# Patient Record
Sex: MALE | Race: WHITE | NOT HISPANIC OR LATINO | URBAN - METROPOLITAN AREA
[De-identification: names, ages, dates, MRNs, and addresses within clinical notes are randomized per-mention and may not be internally consistent; named-entity substitution may affect disease eponyms.]

---

## 2017-07-21 ENCOUNTER — OUTPATIENT (OUTPATIENT)
Dept: OUTPATIENT SERVICES | Facility: HOSPITAL | Age: 67
LOS: 1 days | End: 2017-07-21
Payer: COMMERCIAL

## 2017-07-21 ENCOUNTER — APPOINTMENT (OUTPATIENT)
Dept: MRI IMAGING | Facility: CLINIC | Age: 67
End: 2017-07-21

## 2017-07-21 DIAGNOSIS — Z00.8 ENCOUNTER FOR OTHER GENERAL EXAMINATION: ICD-10-CM

## 2017-07-21 PROCEDURE — 72148 MRI LUMBAR SPINE W/O DYE: CPT

## 2017-08-04 ENCOUNTER — OUTPATIENT (OUTPATIENT)
Dept: OUTPATIENT SERVICES | Facility: HOSPITAL | Age: 67
LOS: 1 days | End: 2017-08-04
Payer: COMMERCIAL

## 2017-08-04 ENCOUNTER — APPOINTMENT (OUTPATIENT)
Dept: CT IMAGING | Facility: CLINIC | Age: 67
End: 2017-08-04
Payer: COMMERCIAL

## 2017-08-04 DIAGNOSIS — Z00.8 ENCOUNTER FOR OTHER GENERAL EXAMINATION: ICD-10-CM

## 2017-08-04 PROCEDURE — 74177 CT ABD & PELVIS W/CONTRAST: CPT

## 2017-08-04 PROCEDURE — 74177 CT ABD & PELVIS W/CONTRAST: CPT | Mod: 26

## 2017-08-21 ENCOUNTER — APPOINTMENT (OUTPATIENT)
Dept: SURGICAL ONCOLOGY | Facility: CLINIC | Age: 67
End: 2017-08-21

## 2017-08-25 ENCOUNTER — RESULT REVIEW (OUTPATIENT)
Age: 67
End: 2017-08-25

## 2017-12-03 ENCOUNTER — INPATIENT (INPATIENT)
Facility: HOSPITAL | Age: 67
LOS: 0 days | Discharge: LEFT BEFORE DISCHARGE | DRG: 871 | End: 2017-12-04
Attending: INTERNAL MEDICINE | Admitting: INTERNAL MEDICINE
Payer: COMMERCIAL

## 2017-12-03 VITALS
RESPIRATION RATE: 20 BRPM | DIASTOLIC BLOOD PRESSURE: 58 MMHG | WEIGHT: 160.06 LBS | HEART RATE: 125 BPM | OXYGEN SATURATION: 94 % | TEMPERATURE: 100 F | SYSTOLIC BLOOD PRESSURE: 116 MMHG

## 2017-12-03 PROCEDURE — 99285 EMERGENCY DEPT VISIT HI MDM: CPT | Mod: 25

## 2017-12-03 RX ORDER — ACETAMINOPHEN 500 MG
1000 TABLET ORAL ONCE
Qty: 0 | Refills: 0 | Status: COMPLETED | OUTPATIENT
Start: 2017-12-03 | End: 2017-12-04

## 2017-12-03 RX ORDER — SODIUM CHLORIDE 9 MG/ML
1000 INJECTION INTRAMUSCULAR; INTRAVENOUS; SUBCUTANEOUS ONCE
Qty: 0 | Refills: 0 | Status: COMPLETED | OUTPATIENT
Start: 2017-12-03 | End: 2017-12-03

## 2017-12-03 NOTE — ED ADULT NURSE NOTE - OBJECTIVE STATEMENT
pt c/o "feeling hot and my fiance said I had felt warm and thought I had a fever. I was wearing a heat pack and thought that was why I was hot but since I am on chemo (gallbladder CA) and on Abx., I wanted to come in and get evaluated.

## 2017-12-03 NOTE — ED PROVIDER NOTE - PROGRESS NOTE DETAILS
Pt has low K 2.7. Denies nausea or vomiting Last chemo 12 days ago Abdomen soft no tender will replace Mg and K --Ritter

## 2017-12-03 NOTE — ED PROVIDER NOTE - ATTENDING CONTRIBUTION TO CARE
67 yom pmhx gb cancer s/p resection many years ago w recurrence of lymph node disease currently on chemo (last dose 10 days ago) [facility], presents with fever this evening.  has had a mild cough over last week nonproductive for which he was given po levaquin x few days w some relief.  has diffuse myalgias and neck pain, no ha or change in mental status.  has had mild urinary freq over last few months intermittently. otherwise, no cp or sob, no abd pain. mild diarrhea since starting abx. called onc office and was told to come to ED for eval. no hx neutropenia.  is getting  to his girlfriend in 6 days from today.  is not eating or drinking well recently and has been going to the infusion center for iv fluids daily, otherwise no sick contacts. is on fentanyl 100 mcg patch (has 4 separate 25 mcg patches on currently which he states is the way he was instructed to apply him)    ROS:   constitutional - + fever, + chills  eyes - no visual changes, no redness  eent - no sore throat, no nasal congestion  cvs - no chest pain, no leg swelling  resp - no shortness of breath, + cough  gi - no abdominal pain, no vomiting, no diarrhea  gu - no dysuria, no hematuria  msk - no acute back pain, no joint swelling  skin - no rashes, no jaundice  neuro - no headache, no focal weakness  psych - no acute mental health issue    Physical Exam:   constitutional - well appearing, awake and alert, oriented x3  head - no external evidence of trauma  ent - no pharyngeal erythema, no tm erythema, no oral thrush  cvs - rrr, no murmurs, no peripheral edema  resp - few mild fine rhonchi left lung field. satting 92 on RA. does not use 02 at home  gi - abdomen soft and nontender, no rigidity, guarding or rebound, bowel sounds present  msk - moving all extremities spontaneously  neuro - alert and oriented x3, no focal deficits, CNs 2-12 grossly intact  skin- no jaundice, warm and dry  psych - mood and affect wnl, no apparent risk to self or others   gu - no inguinal hernias, no testicular tenderness    ? viral syndrome/ bronchitis (most likely ) vs bacteremia. SAMY Gonzalez MD 67 yom pmhx gb cancer s/p resection many years ago w recurrence of lymph node disease currently on chemo (last dose 10 days ago) [facility] presents with fever this evening.  has had a mild cough over last week nonproductive for which he was given po levaquin x few days w some relief.  has diffuse myalgias and neck pain, no ha or change in mental status.  has had mild urinary freq over last few months intermittently. otherwise, no cp or sob, no abd pain. mild diarrhea since starting abx. called onc office and was told to come to ED for eval. no hx neutropenia.  is getting  to his girlfriend in 6 days from today.  is not eating or drinking well recently and has been going to the infusion center for iv fluids daily, otherwise no sick contacts. is on fentanyl 100 mcg patch (has 4 separate 25 mcg patches on currently which he states is the way he was instructed to apply him)    ROS:   constitutional - + fever, + chills  eyes - no visual changes, no redness  eent - no sore throat, no nasal congestion  cvs - no chest pain, no leg swelling  resp - no shortness of breath, + cough  gi - no abdominal pain, no vomiting, no diarrhea  gu - no dysuria, no hematuria  msk - no acute back pain, no joint swelling  skin - no rashes, no jaundice  neuro - no headache, no focal weakness  psych - no acute mental health issue    Physical Exam:   constitutional - well appearing, awake and alert, oriented x3  head - no external evidence of trauma  ent - no pharyngeal erythema, no tm erythema, no oral thrush  cvs - rrr, no murmurs, no peripheral edema  resp - few mild fine rhonchi left lung field. satting 92 on RA. does not use 02 at home  gi - abdomen soft and nontender, no rigidity, guarding or rebound, bowel sounds present  msk - moving all extremities spontaneously  neuro - alert and oriented x3, no focal deficits, CNs 2-12 grossly intact  skin- no jaundice, warm and dry  psych - mood and affect wnl, no apparent risk to self or others   gu - no inguinal hernias, no testicular tenderness    ? viral syndrome/ bronchitis (most likely ) vs bacteremia. will admit as pt w immunosuppression/ chemo w ongoing fever despite PO abx. SAMY Gonzalez MD

## 2017-12-03 NOTE — ED PROVIDER NOTE - PMH
Cervical Stenosis of Spine    Diabetes Mellitus Type II, Controlled    Essential Hypertension    Gallbladder cancer    Hypercholesteremia

## 2017-12-03 NOTE — ED ADULT NURSE NOTE - CHPI ED SYMPTOMS NEG
no weakness/no vomiting/no pain/no dizziness/no numbness/no nausea/no decreased eating/drinking/no tingling

## 2017-12-04 ENCOUNTER — TRANSCRIPTION ENCOUNTER (OUTPATIENT)
Age: 67
End: 2017-12-04

## 2017-12-04 VITALS
OXYGEN SATURATION: 95 % | TEMPERATURE: 101 F | RESPIRATION RATE: 20 BRPM | HEART RATE: 98 BPM | SYSTOLIC BLOOD PRESSURE: 132 MMHG | DIASTOLIC BLOOD PRESSURE: 73 MMHG

## 2017-12-04 DIAGNOSIS — R50.9 FEVER, UNSPECIFIED: ICD-10-CM

## 2017-12-04 DIAGNOSIS — E11.9 TYPE 2 DIABETES MELLITUS WITHOUT COMPLICATIONS: ICD-10-CM

## 2017-12-04 DIAGNOSIS — E86.0 DEHYDRATION: ICD-10-CM

## 2017-12-04 DIAGNOSIS — I10 ESSENTIAL (PRIMARY) HYPERTENSION: ICD-10-CM

## 2017-12-04 DIAGNOSIS — Z29.9 ENCOUNTER FOR PROPHYLACTIC MEASURES, UNSPECIFIED: ICD-10-CM

## 2017-12-04 DIAGNOSIS — E87.6 HYPOKALEMIA: ICD-10-CM

## 2017-12-04 DIAGNOSIS — C23 MALIGNANT NEOPLASM OF GALLBLADDER: ICD-10-CM

## 2017-12-04 DIAGNOSIS — Z79.899 OTHER LONG TERM (CURRENT) DRUG THERAPY: ICD-10-CM

## 2017-12-04 LAB
ALBUMIN SERPL ELPH-MCNC: 3 G/DL — LOW (ref 3.3–5)
ALP SERPL-CCNC: 91 U/L — SIGNIFICANT CHANGE UP (ref 40–120)
ALT FLD-CCNC: 18 U/L RC — SIGNIFICANT CHANGE UP (ref 10–45)
ANION GAP SERPL CALC-SCNC: 12 MMOL/L — SIGNIFICANT CHANGE UP (ref 5–17)
ANION GAP SERPL CALC-SCNC: 15 MMOL/L — SIGNIFICANT CHANGE UP (ref 5–17)
APPEARANCE UR: CLEAR — SIGNIFICANT CHANGE UP
APTT BLD: 25 SEC — LOW (ref 27.5–37.4)
AST SERPL-CCNC: 21 U/L — SIGNIFICANT CHANGE UP (ref 10–40)
BACTERIA # UR AUTO: ABNORMAL /HPF
BASOPHILS # BLD AUTO: 0 K/UL — SIGNIFICANT CHANGE UP (ref 0–0.2)
BASOPHILS NFR BLD AUTO: 0 % — SIGNIFICANT CHANGE UP (ref 0–2)
BILIRUB SERPL-MCNC: 0.4 MG/DL — SIGNIFICANT CHANGE UP (ref 0.2–1.2)
BILIRUB UR-MCNC: NEGATIVE — SIGNIFICANT CHANGE UP
BUN SERPL-MCNC: 8 MG/DL — SIGNIFICANT CHANGE UP (ref 7–23)
BUN SERPL-MCNC: 9 MG/DL — SIGNIFICANT CHANGE UP (ref 7–23)
C TRACH RRNA SPEC QL NAA+PROBE: SIGNIFICANT CHANGE UP
CALCIUM SERPL-MCNC: 8.1 MG/DL — LOW (ref 8.4–10.5)
CALCIUM SERPL-MCNC: 8.3 MG/DL — LOW (ref 8.4–10.5)
CHLORIDE SERPL-SCNC: 93 MMOL/L — LOW (ref 96–108)
CHLORIDE SERPL-SCNC: 97 MMOL/L — SIGNIFICANT CHANGE UP (ref 96–108)
CO2 SERPL-SCNC: 31 MMOL/L — SIGNIFICANT CHANGE UP (ref 22–31)
CO2 SERPL-SCNC: 31 MMOL/L — SIGNIFICANT CHANGE UP (ref 22–31)
COLOR SPEC: YELLOW — SIGNIFICANT CHANGE UP
COMMENT - URINE: SIGNIFICANT CHANGE UP
CREAT SERPL-MCNC: 0.79 MG/DL — SIGNIFICANT CHANGE UP (ref 0.5–1.3)
CREAT SERPL-MCNC: 1.02 MG/DL — SIGNIFICANT CHANGE UP (ref 0.5–1.3)
DIFF PNL FLD: NEGATIVE — SIGNIFICANT CHANGE UP
EOSINOPHIL # BLD AUTO: 0.1 K/UL — SIGNIFICANT CHANGE UP (ref 0–0.5)
EOSINOPHIL NFR BLD AUTO: 0.5 % — SIGNIFICANT CHANGE UP (ref 0–6)
GLUCOSE SERPL-MCNC: 114 MG/DL — HIGH (ref 70–99)
GLUCOSE SERPL-MCNC: 135 MG/DL — HIGH (ref 70–99)
GLUCOSE UR QL: NEGATIVE — SIGNIFICANT CHANGE UP
HCT VFR BLD CALC: 23.5 % — LOW (ref 39–50)
HCT VFR BLD CALC: 25.1 % — LOW (ref 39–50)
HGB BLD-MCNC: 8.5 G/DL — LOW (ref 13–17)
HGB BLD-MCNC: 9 G/DL — LOW (ref 13–17)
INR BLD: 1.38 RATIO — HIGH (ref 0.88–1.16)
KETONES UR-MCNC: NEGATIVE — SIGNIFICANT CHANGE UP
LEUKOCYTE ESTERASE UR-ACNC: NEGATIVE — SIGNIFICANT CHANGE UP
LIDOCAIN IGE QN: 11 U/L — SIGNIFICANT CHANGE UP (ref 7–60)
LYMPHOCYTES # BLD AUTO: 0.7 K/UL — LOW (ref 1–3.3)
LYMPHOCYTES # BLD AUTO: 5.4 % — LOW (ref 13–44)
MACROCYTES BLD QL: SLIGHT — SIGNIFICANT CHANGE UP
MAGNESIUM SERPL-MCNC: 1.4 MG/DL — LOW (ref 1.6–2.6)
MCHC RBC-ENTMCNC: 35 PG — HIGH (ref 27–34)
MCHC RBC-ENTMCNC: 35.2 PG — HIGH (ref 27–34)
MCHC RBC-ENTMCNC: 35.8 GM/DL — SIGNIFICANT CHANGE UP (ref 32–36)
MCHC RBC-ENTMCNC: 36.2 GM/DL — HIGH (ref 32–36)
MCV RBC AUTO: 97.4 FL — SIGNIFICANT CHANGE UP (ref 80–100)
MCV RBC AUTO: 97.8 FL — SIGNIFICANT CHANGE UP (ref 80–100)
MONOCYTES # BLD AUTO: 1.9 K/UL — HIGH (ref 0–0.9)
MONOCYTES NFR BLD AUTO: 14.8 % — HIGH (ref 2–14)
N GONORRHOEA RRNA SPEC QL NAA+PROBE: SIGNIFICANT CHANGE UP
NEUTROPHILS # BLD AUTO: 10.2 K/UL — HIGH (ref 1.8–7.4)
NEUTROPHILS NFR BLD AUTO: 79.2 % — HIGH (ref 43–77)
NITRITE UR-MCNC: NEGATIVE — SIGNIFICANT CHANGE UP
OVALOCYTES BLD QL SMEAR: SLIGHT — SIGNIFICANT CHANGE UP
PH UR: 6 — SIGNIFICANT CHANGE UP (ref 5–8)
PLAT MORPH BLD: NORMAL — SIGNIFICANT CHANGE UP
PLATELET # BLD AUTO: 102 K/UL — LOW (ref 150–400)
PLATELET # BLD AUTO: 105 K/UL — LOW (ref 150–400)
POLYCHROMASIA BLD QL SMEAR: SLIGHT — SIGNIFICANT CHANGE UP
POTASSIUM SERPL-MCNC: 2.6 MMOL/L — CRITICAL LOW (ref 3.5–5.3)
POTASSIUM SERPL-MCNC: 2.7 MMOL/L — CRITICAL LOW (ref 3.5–5.3)
POTASSIUM SERPL-SCNC: 2.6 MMOL/L — CRITICAL LOW (ref 3.5–5.3)
POTASSIUM SERPL-SCNC: 2.7 MMOL/L — CRITICAL LOW (ref 3.5–5.3)
PROCALCITONIN SERPL-MCNC: 0.5 NG/ML — HIGH (ref 0–0.04)
PROT SERPL-MCNC: 6 G/DL — SIGNIFICANT CHANGE UP (ref 6–8.3)
PROT UR-MCNC: SIGNIFICANT CHANGE UP
PROTHROM AB SERPL-ACNC: 15 SEC — HIGH (ref 9.8–12.7)
RAPID RVP RESULT: SIGNIFICANT CHANGE UP
RAPID RVP RESULT: SIGNIFICANT CHANGE UP
RBC # BLD: 2.41 M/UL — LOW (ref 4.2–5.8)
RBC # BLD: 2.57 M/UL — LOW (ref 4.2–5.8)
RBC # FLD: 13.5 % — SIGNIFICANT CHANGE UP (ref 10.3–14.5)
RBC # FLD: 13.5 % — SIGNIFICANT CHANGE UP (ref 10.3–14.5)
RBC BLD AUTO: ABNORMAL
RBC CASTS # UR COMP ASSIST: SIGNIFICANT CHANGE UP /HPF (ref 0–2)
SCHISTOCYTES BLD QL AUTO: SLIGHT — SIGNIFICANT CHANGE UP
SODIUM SERPL-SCNC: 139 MMOL/L — SIGNIFICANT CHANGE UP (ref 135–145)
SODIUM SERPL-SCNC: 140 MMOL/L — SIGNIFICANT CHANGE UP (ref 135–145)
SP GR SPEC: 1.01 — LOW (ref 1.01–1.02)
SPECIMEN SOURCE: SIGNIFICANT CHANGE UP
SPHEROCYTES BLD QL SMEAR: SLIGHT — SIGNIFICANT CHANGE UP
UROBILINOGEN FLD QL: NEGATIVE — SIGNIFICANT CHANGE UP
WBC # BLD: 10 K/UL — SIGNIFICANT CHANGE UP (ref 3.8–10.5)
WBC # BLD: 12.8 K/UL — HIGH (ref 3.8–10.5)
WBC # FLD AUTO: 10 K/UL — SIGNIFICANT CHANGE UP (ref 3.8–10.5)
WBC # FLD AUTO: 12.8 K/UL — HIGH (ref 3.8–10.5)
WBC UR QL: SIGNIFICANT CHANGE UP /HPF (ref 0–5)

## 2017-12-04 PROCEDURE — 85730 THROMBOPLASTIN TIME PARTIAL: CPT

## 2017-12-04 PROCEDURE — 83735 ASSAY OF MAGNESIUM: CPT

## 2017-12-04 PROCEDURE — 80048 BASIC METABOLIC PNL TOTAL CA: CPT

## 2017-12-04 PROCEDURE — 99223 1ST HOSP IP/OBS HIGH 75: CPT | Mod: AI

## 2017-12-04 PROCEDURE — 71250 CT THORAX DX C-: CPT

## 2017-12-04 PROCEDURE — 84145 PROCALCITONIN (PCT): CPT

## 2017-12-04 PROCEDURE — 96374 THER/PROPH/DIAG INJ IV PUSH: CPT

## 2017-12-04 PROCEDURE — 87040 BLOOD CULTURE FOR BACTERIA: CPT

## 2017-12-04 PROCEDURE — 85027 COMPLETE CBC AUTOMATED: CPT

## 2017-12-04 PROCEDURE — 80053 COMPREHEN METABOLIC PANEL: CPT

## 2017-12-04 PROCEDURE — 81001 URINALYSIS AUTO W/SCOPE: CPT

## 2017-12-04 PROCEDURE — 86738 MYCOPLASMA ANTIBODY: CPT

## 2017-12-04 PROCEDURE — 87449 NOS EACH ORGANISM AG IA: CPT

## 2017-12-04 PROCEDURE — 87633 RESP VIRUS 12-25 TARGETS: CPT

## 2017-12-04 PROCEDURE — 99285 EMERGENCY DEPT VISIT HI MDM: CPT | Mod: 25

## 2017-12-04 PROCEDURE — 71020: CPT | Mod: 26

## 2017-12-04 PROCEDURE — 87798 DETECT AGENT NOS DNA AMP: CPT

## 2017-12-04 PROCEDURE — 85610 PROTHROMBIN TIME: CPT

## 2017-12-04 PROCEDURE — 71046 X-RAY EXAM CHEST 2 VIEWS: CPT

## 2017-12-04 PROCEDURE — 71250 CT THORAX DX C-: CPT | Mod: 26

## 2017-12-04 PROCEDURE — 87581 M.PNEUMON DNA AMP PROBE: CPT

## 2017-12-04 PROCEDURE — 87086 URINE CULTURE/COLONY COUNT: CPT

## 2017-12-04 PROCEDURE — 83690 ASSAY OF LIPASE: CPT

## 2017-12-04 PROCEDURE — 87486 CHLMYD PNEUM DNA AMP PROBE: CPT

## 2017-12-04 RX ORDER — ACETAMINOPHEN 500 MG
650 TABLET ORAL EVERY 6 HOURS
Qty: 0 | Refills: 0 | Status: DISCONTINUED | OUTPATIENT
Start: 2017-12-04 | End: 2017-12-04

## 2017-12-04 RX ORDER — INFLUENZA VIRUS VACCINE 15; 15; 15; 15 UG/.5ML; UG/.5ML; UG/.5ML; UG/.5ML
0.5 SUSPENSION INTRAMUSCULAR ONCE
Qty: 0 | Refills: 0 | Status: DISCONTINUED | OUTPATIENT
Start: 2017-12-04 | End: 2017-12-04

## 2017-12-04 RX ORDER — CEFTRIAXONE 500 MG/1
1 INJECTION, POWDER, FOR SOLUTION INTRAMUSCULAR; INTRAVENOUS EVERY 24 HOURS
Qty: 0 | Refills: 0 | Status: DISCONTINUED | OUTPATIENT
Start: 2017-12-05 | End: 2017-12-04

## 2017-12-04 RX ORDER — AZITHROMYCIN 500 MG/1
500 TABLET, FILM COATED ORAL DAILY
Qty: 0 | Refills: 0 | Status: DISCONTINUED | OUTPATIENT
Start: 2017-12-04 | End: 2017-12-04

## 2017-12-04 RX ORDER — POTASSIUM CHLORIDE 20 MEQ
2 PACKET (EA) ORAL
Qty: 0 | Refills: 0 | COMMUNITY

## 2017-12-04 RX ORDER — POTASSIUM CHLORIDE 20 MEQ
40 PACKET (EA) ORAL ONCE
Qty: 0 | Refills: 0 | Status: COMPLETED | OUTPATIENT
Start: 2017-12-04 | End: 2017-12-04

## 2017-12-04 RX ORDER — CEFTRIAXONE 500 MG/1
1 INJECTION, POWDER, FOR SOLUTION INTRAMUSCULAR; INTRAVENOUS ONCE
Qty: 0 | Refills: 0 | Status: COMPLETED | OUTPATIENT
Start: 2017-12-04 | End: 2017-12-04

## 2017-12-04 RX ORDER — ATENOLOL 25 MG/1
50 TABLET ORAL DAILY
Qty: 0 | Refills: 0 | Status: DISCONTINUED | OUTPATIENT
Start: 2017-12-04 | End: 2017-12-04

## 2017-12-04 RX ORDER — POTASSIUM CHLORIDE 20 MEQ
20 PACKET (EA) ORAL
Qty: 0 | Refills: 0 | Status: DISCONTINUED | OUTPATIENT
Start: 2017-12-04 | End: 2017-12-04

## 2017-12-04 RX ORDER — CEFEPIME 1 G/1
1000 INJECTION, POWDER, FOR SOLUTION INTRAMUSCULAR; INTRAVENOUS ONCE
Qty: 0 | Refills: 0 | Status: COMPLETED | OUTPATIENT
Start: 2017-12-04 | End: 2017-12-04

## 2017-12-04 RX ORDER — ATENOLOL 25 MG/1
2 TABLET ORAL
Qty: 0 | Refills: 0 | COMMUNITY

## 2017-12-04 RX ORDER — ENOXAPARIN SODIUM 100 MG/ML
40 INJECTION SUBCUTANEOUS DAILY
Qty: 0 | Refills: 0 | Status: DISCONTINUED | OUTPATIENT
Start: 2017-12-04 | End: 2017-12-04

## 2017-12-04 RX ORDER — FENTANYL CITRATE 50 UG/ML
1 INJECTION INTRAVENOUS
Qty: 0 | Refills: 0 | Status: DISCONTINUED | OUTPATIENT
Start: 2017-12-04 | End: 2017-12-04

## 2017-12-04 RX ORDER — AMLODIPINE BESYLATE 2.5 MG/1
5 TABLET ORAL DAILY
Qty: 0 | Refills: 0 | Status: DISCONTINUED | OUTPATIENT
Start: 2017-12-04 | End: 2017-12-04

## 2017-12-04 RX ORDER — DEXTROSE MONOHYDRATE, SODIUM CHLORIDE, AND POTASSIUM CHLORIDE 50; .745; 4.5 G/1000ML; G/1000ML; G/1000ML
1000 INJECTION, SOLUTION INTRAVENOUS
Qty: 0 | Refills: 0 | Status: DISCONTINUED | OUTPATIENT
Start: 2017-12-04 | End: 2017-12-04

## 2017-12-04 RX ORDER — CEFTRIAXONE 500 MG/1
INJECTION, POWDER, FOR SOLUTION INTRAMUSCULAR; INTRAVENOUS
Qty: 0 | Refills: 0 | Status: DISCONTINUED | OUTPATIENT
Start: 2017-12-04 | End: 2017-12-04

## 2017-12-04 RX ORDER — POTASSIUM CHLORIDE 20 MEQ
20 PACKET (EA) ORAL ONCE
Qty: 0 | Refills: 0 | Status: COMPLETED | OUTPATIENT
Start: 2017-12-04 | End: 2017-12-04

## 2017-12-04 RX ORDER — MAGNESIUM SULFATE 500 MG/ML
2 VIAL (ML) INJECTION ONCE
Qty: 0 | Refills: 0 | Status: COMPLETED | OUTPATIENT
Start: 2017-12-04 | End: 2017-12-04

## 2017-12-04 RX ADMIN — Medication 650 MILLIGRAM(S): at 16:29

## 2017-12-04 RX ADMIN — ENOXAPARIN SODIUM 40 MILLIGRAM(S): 100 INJECTION SUBCUTANEOUS at 11:27

## 2017-12-04 RX ADMIN — AZITHROMYCIN 500 MILLIGRAM(S): 500 TABLET, FILM COATED ORAL at 12:27

## 2017-12-04 RX ADMIN — Medication 400 MILLIGRAM(S): at 00:30

## 2017-12-04 RX ADMIN — CEFEPIME 100 MILLIGRAM(S): 1 INJECTION, POWDER, FOR SOLUTION INTRAMUSCULAR; INTRAVENOUS at 04:34

## 2017-12-04 RX ADMIN — CEFTRIAXONE 100 GRAM(S): 500 INJECTION, POWDER, FOR SOLUTION INTRAMUSCULAR; INTRAVENOUS at 11:26

## 2017-12-04 RX ADMIN — Medication 50 GRAM(S): at 09:38

## 2017-12-04 RX ADMIN — Medication 20 MILLIEQUIVALENT(S): at 11:27

## 2017-12-04 RX ADMIN — SODIUM CHLORIDE 1000 MILLILITER(S): 9 INJECTION INTRAMUSCULAR; INTRAVENOUS; SUBCUTANEOUS at 00:30

## 2017-12-04 RX ADMIN — Medication 40 MILLIEQUIVALENT(S): at 01:31

## 2017-12-04 RX ADMIN — DEXTROSE MONOHYDRATE, SODIUM CHLORIDE, AND POTASSIUM CHLORIDE 100 MILLILITER(S): 50; .745; 4.5 INJECTION, SOLUTION INTRAVENOUS at 11:27

## 2017-12-04 RX ADMIN — Medication 40 MILLIEQUIVALENT(S): at 08:43

## 2017-12-04 NOTE — CONSULT NOTE ADULT - PROBLEM SELECTOR RECOMMENDATION 9
Most suggestive localization is pulmonary and the myalgias, wheezing and no obv consolidation on CXR suggestive of viral etiology.  Recommend standard standard CAP coverage so ceftriaxone/azithro but will repeat RVP and order additional diagnostic tests including chest CT

## 2017-12-04 NOTE — DISCHARGE NOTE ADULT - OTHER SIGNIFICANT FINDINGS
Attending dc note:  66 yo male with metastatic gallbladder CA admitted with chest congestion, cough productive of green sputum, fever, and abn lung exam. no obv consolidation on CXR suggestive of viral etiology.  started on standard CAP coverage  ceftriaxone/azithro. chest CT showed Multiple bilateral subcentimeter pulmonary groundglass nodular opacities and tree-in-bud opacities suggestive of an infectious process. Mediastinal adenopathy as described above, suggestive of malignancy. patient elected to leave against medical advice. Attending dc note:  68 yo male with metastatic gallbladder CA admitted with chest congestion, cough productive of green sputum, fever, and abn lung exam. no obv consolidation on CXR suggestive of viral etiology.  started on standard CAP coverage  ceftriaxone/azithro. chest CT showed Multiple bilateral subcentimeter pulmonary groundglass nodular opacities and tree-in-bud opacities suggestive of an infectious process. Mediastinal adenopathy as described above, suggestive of malignancy. admitted for sepsis likely 2/2 CAP. unknown further details as patient elected to leave against medical advice.

## 2017-12-04 NOTE — CONSULT NOTE ADULT - CONSULT REASON
Fever
metastatic gall bladder cancer and now with fever and diarrhea and failiure to thrive and on antibiotics for bronchitis in the ER

## 2017-12-04 NOTE — H&P ADULT - PROBLEM SELECTOR PLAN 2
Hx chronic hypokalemia on daily K suppl  - will supplement K in IVF and PO  - review EKG to determine tele need, K <3

## 2017-12-04 NOTE — DISCHARGE NOTE ADULT - ADDITIONAL INSTRUCTIONS
Follow up with your primary care Physician in 1-2 days   Follow up with your oncologist in 1-2 days.   Continue all prescription medications.   You may return for any worsening or return of your symptoms.

## 2017-12-04 NOTE — DISCHARGE NOTE ADULT - CARE PLAN
Principal Discharge DX:	Fever, unspecified fever cause  Goal:	Follow up with your Oncologist and Primary care for Further management  Instructions for follow-up, activity and diet:	Call your health care provider upon arrival home from hospital and make a follow up appointment immediately upon discharge to schedule an appointment in 1-2 days.   If you develop fever greater than 101', you have shaking chills, a fast heartbeat, trouble breathing and/or feel your are breathing much faster than usual, call your healthcare provider.  Make sure you wash your hands frequently.  Nutrition is important, eat small frequent meals.  Get lots of rest and drink fluids.  You may return to the emergency room department for any worsening or return of your symptoms.  Secondary Diagnosis:	Gallbladder cancer  Instructions for follow-up, activity and diet:	Follow up with your oncologist immediately upon discharge and call to schedule an make an appointment in 1 week. Principal Discharge DX:	Fever, unspecified fever cause  Goal:	Follow up with your Oncologist and Primary care for Further management  Instructions for follow-up, activity and diet:	Call your health care provider upon arrival home from hospital and make a follow up appointment immediately upon discharge to schedule an appointment in 1-2 days.   If you develop fever greater than 101', you have shaking chills, a fast heartbeat, trouble breathing and/or feel your are breathing much faster than usual, call your healthcare provider.  Make sure you wash your hands frequently.  Nutrition is important, eat small frequent meals.  Get lots of rest and drink fluids.  You may return to the emergency room department for any worsening or return of your symptoms.  Secondary Diagnosis:	Gallbladder cancer  Instructions for follow-up, activity and diet:	Follow up with your oncologist immediately upon discharge and call to schedule an make an appointment in 1-2 days

## 2017-12-04 NOTE — H&P ADULT - NSHPSOCIALHISTORY_GEN_ALL_CORE
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

## 2017-12-04 NOTE — ED ADULT NURSE REASSESSMENT NOTE - NS ED NURSE REASSESS COMMENT FT1
Report received from Brian CULLEN. Pt remains A&Ox4 gross neuro intact. VSS, NAD at this time. Lungs cta, s1s2 heart sounds heard, pulsesx4, maex4, abdomen soft nontender nondistended, skin intact. Pt has right chest wall mediport not accessed at this time. IV flushes freely no signs of infiltrate noted. Safety and comfort maintained. Pt. currently admitted waiting inpatient bed placement.

## 2017-12-04 NOTE — DISCHARGE NOTE ADULT - MEDICATION SUMMARY - MEDICATIONS TO STOP TAKING
I will STOP taking the medications listed below when I get home from the hospital:    NexIUM  --  by mouth    aspirin  --  by mouth    atorvastatin  --  by mouth    atenolol 25 mg oral tablet  -- 1 tab(s) by mouth once a day    Diovan  --  by mouth

## 2017-12-04 NOTE — CONSULT NOTE ADULT - PROBLEM SELECTOR RECOMMENDATION 3
per medicine.    Thank you for consulting us and involving us in the management of this most interesting and challenging case.     We will follow along in the care of this patient.

## 2017-12-04 NOTE — H&P ADULT - HISTORY OF PRESENT ILLNESS
67 yom pmhx gb cancer s/p resection many years ago w recurrence of lymph node disease currently on chemo (last dose 10 days ago) [facility] presents with fever this evening.  has had a mild cough over last week nonproductive for which he was given po levaquin x few days w some relief.  has diffuse myalgias and neck pain, no ha or change in mental status.  has had mild urinary freq over last few months intermittently. otherwise, no cp or sob, no abd pain. mild diarrhea since starting abx. called onc office and was told to come to ED for eval. no hx neutropenia.  is getting  to his girlfriend in 6 days from today.  is not eating or drinking well recently and has been going to the infusion center for iv fluids daily, otherwise no sick contacts. is on fentanyl 100 mcg patch (has 4 separate 25 mcg patches on currently which he states is the way he was instructed to apply him) 67 yom pmhx gb cancer s/p resection many years ago w recurrence of lymph node disease currently on chemo (last dose 10 days ago), HTN, spinal stenosis presents with fever for 1 day.  Patient states he has seen his Onc 11/27/17 and treated with Levaquin for chest congestion on exam. + diffuse myalgias and neck pain, + feeling of incomplete emptying of bladder with urination for the past few months intermittently, occasional diarrhea for 1-2 weeks, but no ha or change in mental status, no chest pain, no cough, no abd pain/n/v.

## 2017-12-04 NOTE — DISCHARGE NOTE ADULT - CARE PROVIDER_API CALL
Miguel Mireles), Hematology; Internal Medicine; Medical Oncology  [address]  [address]  [place] [address]  Phone: [phone]  Fax: [phone]

## 2017-12-04 NOTE — DISCHARGE NOTE ADULT - PATIENT PORTAL LINK FT
“You can access the [facility] Patient Portal, offered by [facility], by registering with the following website: [email or web address]”

## 2017-12-04 NOTE — DISCHARGE NOTE ADULT - HOSPITAL COURSE
68 y/o M pmhx gb cancer s/p resection many years ago w recurrence of lymph node disease currently on chemo (last dose 10 days ago) p/w fever despite on Levaquin admitted with concern for occult infection with sepsis. PT had CXR suggestive of viral etiology.  Recommended and Seen per ID for standard CAP coverage so antibiotics was started. Additional diagnostic tests including chest CT and repeat RVP was to be preformed. However patient no longer wanted to stay for inpatient hospital work up and request to sign out against medical advice. Arianna was present at bedside. Pt Alert and Oriented x 3 and Risk vs Benefits was explained to patient at length including worsened infection, sepsis, and sudden death can occur. Patient verbalizes full understanding with Arianna present Spoke to [facility] Attending Dr Cardozo who was informed and made aware. Agrees with plan of care and understands patient has capacity to leave hospital. Hospital AMA formed signed and placed in chart. Pt was advised to follow up with primary care and oncology physician immediately upon Leave of AMA. Patient left without receiving documents. 66 y/o M pmhx gb cancer s/p resection many years ago w recurrence of lymph node disease currently on chemo (last dose 10 days ago) p/w fever despite on Levaquin admitted with concern for occult infection with sepsis. PT had CXR suggestive of viral etiology.  Recommended and Seen per ID for standard CAP coverage so antibiotics was started. Additional diagnostic tests including chest CT and repeat RVP was to be preformed. However patient no longer wanted to stay for inpatient hospital treatment/ work up and request to sign out against medical advice. Arianna was present at bedside. Pt Alert and Oriented x 3, mentating well, and Risk vs Benefits was explained to patient at length including worsened infection, sepsis, and sudden death can occur. Patient verbalizes full understanding with Fiance present Spoke to [facility] Attending Dr Cardozo who was informed and made aware. Agrees with plan of care and understands patient has capacity to leave hospital. Hospital AMA formed signed and placed in chart. Pt was advised to follow up with primary care and oncology physician immediately upon Leave of AMA. Patient left without receiving documents. 68 y/o M pmhx gb cancer s/p resection many years ago w recurrence of lymph node disease currently on chemo (last dose 10 days ago) p/w fever despite on Levaquin admitted with concern for occult infection with sepsis. PT had CXR suggestive of viral etiology.  Recommended and Seen per ID for standard CAP coverage so antibiotics was started. Additional diagnostic tests including chest CT and repeat RVP was preformed. However patient no longer wanted to stay for inpatient hospital treatment/ work up and request to sign out against medical advice. Arianna was present at bedside. Pt Alert and Oriented x 3, mentating well, and Risk vs Benefits was explained to patient at length including worsened infection, sepsis, and sudden death can occur. Patient verbalizes full understanding with Fiance present Spoke to [facility] Attending Dr Cardozo who was informed and made aware. Agrees with plan of care and understands patient has capacity to leave hospital. Hospital AMA formed signed and placed in chart. Pt was advised to follow up with primary care and oncology physician immediately upon Leave of AMA. Patient left without receiving documents.

## 2017-12-04 NOTE — H&P ADULT - NSHPATTENDINGPLANDISCUSS_GEN_ALL_CORE
Dr. Abad who requested initial evaluation/H&P and will assume care of this patient later today Dr. Swanson who requested initial evaluation/H&P and will assume care of this patient later today

## 2017-12-04 NOTE — DISCHARGE NOTE ADULT - MEDICATION SUMMARY - MEDICATIONS TO TAKE
I will START or STAY ON the medications listed below when I get home from the hospital:    Percocet 5/325 oral tablet  -- 2 tab(s) by mouth every 4 hours, As Needed    Please see internal esperanza    -- Indication: For Gallbladder cancer    fentanyl topical 75 mcg/hr transdermal film, extended release (obsolete)  -- 1 patch by transdermal patch every 3 days    Note: Patient applied fentanyl 25mcg x 4 patches 2 days ago (on 12/2/17).     -- Indication: For Gallbladder cancer    atenolol 25 mg oral tablet  -- 2 tab(s) by mouth once a day  -- Indication: For Essential Hypertension    amLODIPine 5 mg oral tablet  -- 1 tab(s) by mouth once a day  -- Indication: For Essential Hypertension

## 2017-12-04 NOTE — H&P ADULT - PROBLEM SELECTOR PLAN 7
Patient only remembers Atenolol and K supplementation.  He stated to take another medicine at night but unsure of the name.  Patient lives alone.  - will need day team to reconcile home meds.

## 2017-12-04 NOTE — H&P ADULT - ASSESSMENT
67 yom pmhx gb cancer s/p resection many years ago w recurrence of lymph node disease currently on chemo (last dose 10 days ago) p/w fever despite on Levaquin admitted with 67 yom pmhx gb cancer s/p resection many years ago w recurrence of lymph node disease currently on chemo (last dose 10 days ago) p/w fever despite on Levaquin admitted with concern for occult infection with sepsis.

## 2017-12-04 NOTE — H&P ADULT - PROBLEM SELECTOR PLAN 1
with sepsis (fever/tachycardia) w/o focal symptoms of infection, on Levaquin  - s/p Cefepime once in ED, ID eval in am  - will f/u BCx with sepsis (fever/tachycardia) w/o focal symptoms of infection, on Levaquin  - s/p Cefepime once in ED at 4am, unclear source of infection  - ID eval in am for further w/u source of infection and possible empiric Abx  - will f/u BCx with sepsis (fever/tachycardia) w/o focal symptoms of infection, on Levaquin  - s/p Cefepime once in ED at 4am, unclear source of infection, will hold off Abx for now.  case d/w Dr. Swanson  - ID shakiral in am for further w/u source of infection and possible empiric Abx if needed  - will f/u BCx

## 2017-12-04 NOTE — DISCHARGE NOTE ADULT - PLAN OF CARE
Follow up with your Oncologist and Primary care for Further management Call your health care provider upon arrival home from hospital and make a follow up appointment immediately upon discharge to schedule an appointment in 1-2 days.   If you develop fever greater than 101', you have shaking chills, a fast heartbeat, trouble breathing and/or feel your are breathing much faster than usual, call your healthcare provider.  Make sure you wash your hands frequently.  Nutrition is important, eat small frequent meals.  Get lots of rest and drink fluids.  You may return to the emergency room department for any worsening or return of your symptoms. Follow up with your oncologist immediately upon discharge and call to schedule an make an appointment in 1 week. Follow up with your oncologist immediately upon discharge and call to schedule an make an appointment in 1-2 days

## 2017-12-04 NOTE — PATIENT PROFILE ADULT. - IS PATIENT PREGNANT?
March 7, 2017        Krunal Arboleda MD  [address]   [address]  [place]  [facility] [address]             [facility] Breast Surgery  [address]  [facility] [address]  Phone: [phone]   Patient: Lima Maldonado   MR Number: [id number]   YOB: 1948   Date of Visit: 3/7/2017       Dear Dr. Arboleda:    Thank you for referring Lima Maldonado to me for evaluation. Attached you will find relevant portions of my assessment and plan of care.    If you have questions, please do not hesitate to call me. I look forward to following Lima Maldonado along with you.    Sincerely,      Jimmy Bains MD            CC  Kamran Khoobehi, MD Enclosure          not applicable (Male)

## 2017-12-04 NOTE — CONSULT NOTE ADULT - ASSESSMENT
66 yo male with metastatic gallbladder CA admitted with chest congestion, cough productive of green sputum, fever, and abn lung exam
The pt likely is weak from the chemotherapy and the diarrhea is likely from the drugs as well. the white cell count was 12.8 hemoglobin of 9.0 and platelet count of 244345 there were 79 polys 5 lymphs 15 monos and 1 eos. the creatinine was 1.02. the counts will be monitored and the white count will probably not drop as the chemo was several days ago. Continue present mgt and I will discuss the case with the ONC attending if there are any other issues of note here. I told the pt about his pet scan results.

## 2017-12-04 NOTE — CONSULT NOTE ADULT - SUBJECTIVE AND OBJECTIVE BOX
HPI:  68 yo m pmhx gb cancer s/p resection many years ago w recurrence of lymph node disease currently on chemo (last dose 10 days ago), HTN, spinal stenosis presents with fever for 1 day.  Patient states he has seen his Onc 17 and treated with Levaquin for chest congestion on exam. + diffuse myalgias and neck pain, + feeling of incomplete emptying of bladder with urination for the past few months intermittently, occasional diarrhea for 1-2 weeks, but no ha or change in mental status, no chest pain,, no abd pain/n/v. (04 Dec 2017 07:32)    When I speak with patient he reports cough productive of green sputum.      PAST MEDICAL & SURGICAL HISTORY:  Gallbladder cancer  Cervical Stenosis of Spine  Hypercholesteremia  Essential Hypertension  Diabetes Mellitus Type II, Controlled  S/P Endoscopy  S/P Cardiac Catheterization  Bilateral Cataracts  History of Colonoscopy      Antimicrobials      Immunological  influenza   Vaccine 0.5 milliLiter(s) IntraMuscular once      Other  acetaminophen   Tablet 650 milliGRAM(s) Oral every 6 hours PRN  enoxaparin Injectable 40 milliGRAM(s) SubCutaneous daily  potassium chloride    Tablet ER 20 milliEquivalent(s) Oral once  sodium chloride 0.9% with potassium chloride 40 mEq/L 1000 milliLiter(s) IV Continuous <Continuous>      Allergies    No Known Allergies    Intolerances        SOCIAL HISTORY: prior tobacco, no overseas travel, not in [place] war    FAMILY HISTORY: noncontributory      ROS:    EYES:  Negative  blurry vision or double vision  GASTROINTESTINAL:  Negative for nausea, vomiting, diarrhea  -otherwise negative except for subjective    Vital Signs Last 24 Hrs  T(C): 36.8 (04 Dec 2017 08:24), Max: 38.6 (03 Dec 2017 23:45)  T(F): 98.2 (04 Dec 2017 08:24), Max: 101.5 (03 Dec 2017 23:45)  HR: 90 (04 Dec 2017 08:24) (81 - 125)  BP: 124/68 (04 Dec 2017 08:24) (110/68 - 124/68)  BP(mean): --  RR: 19 (04 Dec 2017 08:24) (18 - 20)  SpO2: 94% (04 Dec 2017 08:24) (94% - 96%)    PE:  WDWN in no distress  HEENT:  NC, PERRL, sclerae anicteric, conjunctivae clear, EOMI.  Sinuses nontender, no nasal exudate.  No buccal or pharyngeal lesions, erythema or exudate  Neck:  Supple, no adenopathy  Lungs:  No accessory muscle use, crackles and wheezes more prominant right upper and mid lung fields  Cor:  RRR, S1, S2, no murmur appreciated  Abd:  Symmetric, normoactive BS.  Soft, nontender, no masses, guarding or rebound.  Liver and spleen not enlarged  Extrem:  No cyanosis or edema  Skin:  No rashes.  Neuro: grossly intact  Musc: moving all limbs freely, no focal deficits    LABS:                        8.5    10.0  )-----------( 105      ( 04 Dec 2017 06:25 )             23.5     12-04    140  |  97  |  8   ----------------------------<  114<H>  2.7<LL>   |  31  |  0.79    Ca    8.1<L>      04 Dec 2017 06:25  Mg     1.4     12-04    TPro  6.0  /  Alb  3.0<L>  /  TBili  0.4  /  DBili  x   /  AST  21  /  ALT  18  /  AlkPhos  91  12-03    Urinalysis Basic - ( 04 Dec 2017 02:48 )    Color: Yellow / Appearance: Clear / S.008 / pH: x  Gluc: x / Ketone: Negative  / Bili: Negative / Urobili: Negative   Blood: x / Protein: Trace / Nitrite: Negative   Leuk Esterase: Negative / RBC: 0-2 /HPF / WBC 3-5 /HPF   Sq Epi: x / Non Sq Epi: x / Bacteria: Few /HPF        MICROBIOLOGY:    Rapid Respiratory Viral Panel (17 @ 23:59)    Rapid RVP Result: NotDetec: The Film[employer] RVP Rapid uses polymerase chain reaction (PCR) and melt  curve analysis to screen for adenovirus; coronavirus HKU1, NL63, 229E,  OC43; human metapneumovirus (hMPV); human enterovirus/rhinovirus  (Entero/RV); influenza A; influenza A/H1;influenza A/H3; influenza  A/H1-2009; influenza B; parainfluenza viruses 1, 2, 3, 4; respiratory  syncytial virus; Bordetella pertussis; Mycoplasma pneumoniae; and  Chlamydophila pneumoniae.        RADIOLOGY & ADDITIONAL STUDIES:    --< from: Xray Chest 2 Views PA/Lat (17 @ 00:22) >  EXAM:  CHEST PA & LAT                            PROCEDURE DATE:  2017            INTERPRETATION:  CLINICAL INFORMATION: Fever and cough.    TECHNIQUE: PA and lateral chest radiographs.    COMPARISON: Chest radiograph performed 2015.    FINDINGS:     A right sided central venous catheter tip terminates over SVC.  The lungs are clear. No pleural effusion or pneumothorax is seen   bilaterally.  The cardiac silhouette is unremarkable.  Thoracic spine spondylosis. Orthopedic hardware overlies the cervical   spine.    IMPRESSION:  Clear lungs.
67 year old male presents now with fever, DM, htn, Gall bladder cancer and history of cholecystectomy. he presents now with cough and diarrhea and weakness for a few days after the chemo.   the pt is a  and after a period of cancer free from surgery, he relapsed adn has been placed on GEM/OX. He has had diarrhea, cough and was placed on Levaquin as a outpt. a PET scan showed decreased LNs in the abdomen but there was increased uptake in the nodes of the chest consistent with infection ? mets. He was noted to have fever in the ER and was placed on the meds above. consult was requested

## 2017-12-04 NOTE — H&P ADULT - NSHPLABSRESULTS_GEN_ALL_CORE
Labs personally reviewed  CXR film personally reviewed  EKG tracing personally reviewed Labs personally reviewed  CXR film personally reviewed  EKG tracing ordered

## 2017-12-05 LAB
CULTURE RESULTS: NO GROWTH — SIGNIFICANT CHANGE UP
M PNEUMO IGG SER IA-ACNC: 1.82 INDEX — HIGH
M PNEUMO IGG SER IA-ACNC: POSITIVE
M PNEUMO IGM SER-ACNC: 84 UNITS/ML — SIGNIFICANT CHANGE UP
MYCOPLASMA AG SPEC QL: NEGATIVE — SIGNIFICANT CHANGE UP
SPECIMEN SOURCE: SIGNIFICANT CHANGE UP

## 2017-12-06 LAB — LEGIONELLA AG UR QL: NEGATIVE — SIGNIFICANT CHANGE UP

## 2017-12-09 LAB
CULTURE RESULTS: SIGNIFICANT CHANGE UP
CULTURE RESULTS: SIGNIFICANT CHANGE UP
SPECIMEN SOURCE: SIGNIFICANT CHANGE UP
SPECIMEN SOURCE: SIGNIFICANT CHANGE UP

## 2024-09-23 NOTE — ED ADULT NURSE NOTE - PMH
Cervical Stenosis of Spine    Diabetes Mellitus Type II, Controlled    Essential Hypertension    Gallbladder cancer    Hypercholesteremia
No